# Patient Record
Sex: FEMALE | Race: WHITE | NOT HISPANIC OR LATINO | URBAN - NONMETROPOLITAN AREA
[De-identification: names, ages, dates, MRNs, and addresses within clinical notes are randomized per-mention and may not be internally consistent; named-entity substitution may affect disease eponyms.]

---

## 2019-12-04 ENCOUNTER — IMPORTED ENCOUNTER (OUTPATIENT)
Dept: URBAN - NONMETROPOLITAN AREA CLINIC 1 | Facility: CLINIC | Age: 49
End: 2019-12-04

## 2019-12-04 PROCEDURE — S0621 ROUTINE OPHTHALMOLOGICAL EXA: HCPCS

## 2019-12-04 NOTE — PATIENT DISCUSSION
Presbyopia-Discussed diagnosis with patient. Updated spec Rx given.; 's Notes: [facility] [employer]. Uses computer a lot.

## 2022-02-22 NOTE — PATIENT DISCUSSION
Educated patient on findings. Epilated single misdirected lash in office today without complications. Continue artificial tears QID/prn for any remaining irritation. Advised to call/RTC if si/sx return. Monitor.

## 2022-04-09 ASSESSMENT — VISUAL ACUITY
OD_CC: J1
OD_CC: 20/80-1
OS_CC: J1
OS_CC: 20/40

## 2022-04-09 ASSESSMENT — TONOMETRY
OS_IOP_MMHG: 14
OD_IOP_MMHG: 15

## 2025-06-03 ENCOUNTER — COMPREHENSIVE EXAM (OUTPATIENT)
Age: 55
End: 2025-06-03

## 2025-06-03 DIAGNOSIS — H52.4: ICD-10-CM

## 2025-06-03 PROCEDURE — 92015 DETERMINE REFRACTIVE STATE: CPT

## 2025-06-03 PROCEDURE — 92004 COMPRE OPH EXAM NEW PT 1/>: CPT
